# Patient Record
Sex: FEMALE | Race: WHITE | Employment: FULL TIME | URBAN - METROPOLITAN AREA
[De-identification: names, ages, dates, MRNs, and addresses within clinical notes are randomized per-mention and may not be internally consistent; named-entity substitution may affect disease eponyms.]

---

## 2017-04-19 DIAGNOSIS — F51.01 PRIMARY INSOMNIA: ICD-10-CM

## 2017-04-21 ENCOUNTER — TELEPHONE (OUTPATIENT)
Dept: FAMILY MEDICINE CLINIC | Age: 52
End: 2017-04-21

## 2017-04-21 DIAGNOSIS — F51.01 PRIMARY INSOMNIA: ICD-10-CM

## 2017-04-21 RX ORDER — TEMAZEPAM 30 MG/1
CAPSULE ORAL
Qty: 30 CAPSULE | Refills: 5 | OUTPATIENT
Start: 2017-04-21

## 2017-04-21 RX ORDER — TEMAZEPAM 30 MG/1
CAPSULE ORAL
Qty: 5 CAPSULE | Refills: 0 | Status: SHIPPED | OUTPATIENT
Start: 2017-04-21 | End: 2017-04-24 | Stop reason: SDUPTHER

## 2017-04-24 ENCOUNTER — OFFICE VISIT (OUTPATIENT)
Dept: FAMILY MEDICINE CLINIC | Age: 52
End: 2017-04-24

## 2017-04-24 VITALS
WEIGHT: 172 LBS | BODY MASS INDEX: 31.65 KG/M2 | HEIGHT: 62 IN | RESPIRATION RATE: 18 BRPM | HEART RATE: 80 BPM | DIASTOLIC BLOOD PRESSURE: 80 MMHG | TEMPERATURE: 98.1 F | SYSTOLIC BLOOD PRESSURE: 124 MMHG

## 2017-04-24 DIAGNOSIS — F51.01 PRIMARY INSOMNIA: ICD-10-CM

## 2017-04-24 DIAGNOSIS — M15.9 PRIMARY OSTEOARTHRITIS INVOLVING MULTIPLE JOINTS: ICD-10-CM

## 2017-04-24 DIAGNOSIS — J30.89 PERENNIAL ALLERGIC RHINITIS WITH SEASONAL VARIATION: ICD-10-CM

## 2017-04-24 DIAGNOSIS — Z12.11 COLON CANCER SCREENING: ICD-10-CM

## 2017-04-24 DIAGNOSIS — J30.2 PERENNIAL ALLERGIC RHINITIS WITH SEASONAL VARIATION: ICD-10-CM

## 2017-04-24 DIAGNOSIS — M19.90 ARTHRITIS: Primary | ICD-10-CM

## 2017-04-24 DIAGNOSIS — M81.0 OSTEOPOROSIS: ICD-10-CM

## 2017-04-24 PROCEDURE — 99214 OFFICE O/P EST MOD 30 MIN: CPT | Performed by: FAMILY MEDICINE

## 2017-04-24 RX ORDER — TEMAZEPAM 30 MG/1
CAPSULE ORAL
Qty: 5 CAPSULE | Refills: 0 | Status: CANCELLED | OUTPATIENT
Start: 2017-04-24

## 2017-04-24 RX ORDER — TEMAZEPAM 30 MG/1
CAPSULE ORAL
Qty: 30 CAPSULE | Refills: 0 | Status: SHIPPED | OUTPATIENT
Start: 2017-04-24 | End: 2018-01-10 | Stop reason: ALTCHOICE

## 2017-04-24 RX ORDER — AMITRIPTYLINE HYDROCHLORIDE 25 MG/1
25 TABLET, FILM COATED ORAL NIGHTLY
Qty: 30 TABLET | Refills: 3 | Status: SHIPPED | OUTPATIENT
Start: 2017-04-24 | End: 2017-08-25 | Stop reason: SDUPTHER

## 2017-04-24 ASSESSMENT — PATIENT HEALTH QUESTIONNAIRE - PHQ9
SUM OF ALL RESPONSES TO PHQ9 QUESTIONS 1 & 2: 0
1. LITTLE INTEREST OR PLEASURE IN DOING THINGS: 0
SUM OF ALL RESPONSES TO PHQ QUESTIONS 1-9: 0
2. FEELING DOWN, DEPRESSED OR HOPELESS: 0

## 2017-08-25 RX ORDER — AMITRIPTYLINE HYDROCHLORIDE 25 MG/1
TABLET, FILM COATED ORAL
Qty: 30 TABLET | Refills: 3 | Status: SHIPPED | OUTPATIENT
Start: 2017-08-25

## 2018-01-10 ENCOUNTER — OFFICE VISIT (OUTPATIENT)
Dept: FAMILY MEDICINE CLINIC | Age: 53
End: 2018-01-10

## 2018-01-10 VITALS
HEART RATE: 96 BPM | DIASTOLIC BLOOD PRESSURE: 80 MMHG | HEIGHT: 62 IN | WEIGHT: 175 LBS | BODY MASS INDEX: 32.2 KG/M2 | RESPIRATION RATE: 18 BRPM | SYSTOLIC BLOOD PRESSURE: 112 MMHG

## 2018-01-10 DIAGNOSIS — M54.50 ACUTE MIDLINE LOW BACK PAIN WITHOUT SCIATICA: Primary | ICD-10-CM

## 2018-01-10 PROCEDURE — 99213 OFFICE O/P EST LOW 20 MIN: CPT | Performed by: FAMILY MEDICINE

## 2018-01-10 RX ORDER — NAPROXEN 500 MG/1
500 TABLET ORAL 2 TIMES DAILY WITH MEALS
Qty: 60 TABLET | Refills: 3 | Status: SHIPPED | OUTPATIENT
Start: 2018-01-10 | End: 2019-01-10

## 2018-01-10 RX ORDER — ACETAMINOPHEN AND CODEINE PHOSPHATE 300; 30 MG/1; MG/1
1 TABLET ORAL
Qty: 35 TABLET | Refills: 0 | Status: SHIPPED | OUTPATIENT
Start: 2018-01-10 | End: 2018-01-17

## 2018-01-10 NOTE — PROGRESS NOTES
Chief Complaint   Patient presents with    Back Pain     fell down stairs        HPI: Gricelad Menon is a 45 y.o. female presenting for evaluation of back pain. She fell down stairs at home on 1-5-18. She did not seek ER. She has tried Extra Strength Tylenol with no relief. Heating pad and ice have not helped. She thinks her socks slipped, she lost her footing and fell down 3 or 4 steps and wound up twisted at the bottom of the stairs. Controlled Substances Monitoring: Attestation: The Prescription Monitoring Report for this patient was reviewed today. Estefanía Fletcher MD)  Documentation: No signs of potential drug abuse or diversion identified. Estefanía Fletcher MD). Temazepam is the only control substances prescribed for this patient in the last year and that has not been done recently. EXAM:  Constitutional Blood pressure 112/80, pulse 96, resp. rate 18, height 5' 2\" (1.575 m), weight 175 lb (79.4 kg). Physical Exam   Cardiovascular: Normal rate and regular rhythm. Pulmonary/Chest: Effort normal and breath sounds normal. No respiratory distress. She has no wheezes. Abdominal: Soft. She exhibits no distension. This patient is obese. Body mass index is 32.01 kg/m². Musculoskeletal:   The patient has point tenderness in the mid to lower lumbar spine in the midline. No sacroiliac joint tenderness. DIAGNOSIS:   1. Acute midline low back pain without sciatica  XR LUMBAR SPINE (MIN 4 VIEWS)    naproxen (EC-NAPROSYN) 500 MG EC tablet    acetaminophen-codeine (TYLENOL #3) 300-30 MG per tablet    Symptomatic, treat acute pain. due to high cost of x-rays here relative to what she would have to pay at [facility], patient will go to [facility] for her x-rays. In the meantime we will treat this as acute low back pain. Plan for follow up: Follow up in near future for abnormal x-ray results or poor pain response. Other follow up as needed.       Electronically signed by Flavio Abreu, 8:19 PM 1/10/18

## 2018-01-18 ENCOUNTER — TELEPHONE (OUTPATIENT)
Dept: FAMILY MEDICINE CLINIC | Age: 53
End: 2018-01-18

## 2018-01-18 NOTE — TELEPHONE ENCOUNTER
Patient called in because she needs a referral to [facility] in [address] [place] [address], [address] [facility]. Please faxed referral to  office along with x-ray report from 1/10/18 done at [facility]. [facility]#[phone]. Patient has an appointment on Monday (1/22/18). Please advise.

## 2019-06-13 ENCOUNTER — TELEPHONE (OUTPATIENT)
Dept: ADMINISTRATIVE | Age: 54
End: 2019-06-13

## 2023-10-13 ENCOUNTER — OFFICE VISIT (OUTPATIENT)
Dept: PRIMARY CARE | Facility: CLINIC | Age: 58
End: 2023-10-13
Payer: COMMERCIAL

## 2023-10-13 VITALS
BODY MASS INDEX: 35.08 KG/M2 | HEART RATE: 76 BPM | DIASTOLIC BLOOD PRESSURE: 100 MMHG | HEIGHT: 63 IN | SYSTOLIC BLOOD PRESSURE: 160 MMHG | WEIGHT: 198 LBS

## 2023-10-13 DIAGNOSIS — M06.9 RHEUMATOID ARTHRITIS, INVOLVING UNSPECIFIED SITE, UNSPECIFIED WHETHER RHEUMATOID FACTOR PRESENT (MULTI): ICD-10-CM

## 2023-10-13 DIAGNOSIS — Q35.9 CLEFT PALATE (HHS-HCC): ICD-10-CM

## 2023-10-13 DIAGNOSIS — M06.9 RHEUMATOID ARTHRITIS INVOLVING MULTIPLE SITES, UNSPECIFIED WHETHER RHEUMATOID FACTOR PRESENT (MULTI): Primary | ICD-10-CM

## 2023-10-13 DIAGNOSIS — I10 PRIMARY HYPERTENSION: ICD-10-CM

## 2023-10-13 DIAGNOSIS — Z12.9 CANCER SCREENING: ICD-10-CM

## 2023-10-13 DIAGNOSIS — D72.829 LEUKOCYTOSIS, UNSPECIFIED TYPE: ICD-10-CM

## 2023-10-13 DIAGNOSIS — Z13.6 SCREENING, ISCHEMIC HEART DISEASE: ICD-10-CM

## 2023-10-13 PROBLEM — E66.09 CLASS 2 OBESITY DUE TO EXCESS CALORIES WITH BODY MASS INDEX (BMI) OF 35.0 TO 35.9 IN ADULT: Status: ACTIVE | Noted: 2023-10-13

## 2023-10-13 PROBLEM — E66.812 CLASS 2 OBESITY DUE TO EXCESS CALORIES WITH BODY MASS INDEX (BMI) OF 35.0 TO 35.9 IN ADULT: Status: ACTIVE | Noted: 2023-10-13

## 2023-10-13 PROCEDURE — 3077F SYST BP >= 140 MM HG: CPT | Performed by: INTERNAL MEDICINE

## 2023-10-13 PROCEDURE — 3080F DIAST BP >= 90 MM HG: CPT | Performed by: INTERNAL MEDICINE

## 2023-10-13 PROCEDURE — 99214 OFFICE O/P EST MOD 30 MIN: CPT | Performed by: INTERNAL MEDICINE

## 2023-10-13 PROCEDURE — 1036F TOBACCO NON-USER: CPT | Performed by: INTERNAL MEDICINE

## 2023-10-13 RX ORDER — NABUMETONE 500 MG/1
500 TABLET, FILM COATED ORAL 2 TIMES DAILY
COMMUNITY
Start: 2023-09-27

## 2023-10-13 RX ORDER — ERGOCALCIFEROL 1.25 MG/1
50000 CAPSULE ORAL
COMMUNITY
Start: 2021-11-10

## 2023-10-13 RX ORDER — LOSARTAN POTASSIUM AND HYDROCHLOROTHIAZIDE 12.5; 5 MG/1; MG/1
1 TABLET ORAL DAILY
Qty: 30 TABLET | Refills: 11 | Status: SHIPPED | OUTPATIENT
Start: 2023-10-13 | End: 2023-11-06

## 2023-10-13 RX ORDER — FOLIC ACID 1 MG/1
1 TABLET ORAL DAILY
COMMUNITY
Start: 2022-03-06

## 2023-10-13 RX ORDER — MONTELUKAST SODIUM 10 MG/1
10 TABLET ORAL NIGHTLY
COMMUNITY

## 2023-10-13 RX ORDER — METHOTREXATE 2.5 MG/1
8 TABLET ORAL
COMMUNITY
Start: 2022-09-08

## 2023-10-13 RX ORDER — TOFACITINIB 11 MG/1
11 TABLET, FILM COATED, EXTENDED RELEASE ORAL DAILY
COMMUNITY

## 2023-10-13 ASSESSMENT — ENCOUNTER SYMPTOMS
ARTHRALGIAS: 1
GASTROINTESTINAL NEGATIVE: 1
CONSTITUTIONAL NEGATIVE: 1
CARDIOVASCULAR NEGATIVE: 1
HYPERTENSION: 1
PSYCHIATRIC NEGATIVE: 1

## 2023-10-13 NOTE — PROGRESS NOTES
"Subjective   Patient ID: Petra Higuera is a 58 y.o. female who presents for Weight Check (Patient feels like she's gaining weight) and Hypertension (Patient blood pressure is going up).    Patient here for follow-up she is unable to lose weight she tried intermittent fasting her blood pressure is out of control she does have rheumatoid arthritis and sees rheumatology.  She does have arthritic and nerve pain.  She is requesting Adipex but we cannot give it to her at this point due to uncontrolled blood pressure.  She can be a candidate for Wegovy but cost for that is very high for her is about $1000 per month which obviously is not affordable for anyone    Hypertension       Patient blood pressure is not good she is asymptomatic no headaches or dizziness but at a level where her blood pressure is she cannot be given weight lowering medicines like Adipex she needs to follow DASH diet and lifestyle modification  Review of Systems   Constitutional: Negative.    HENT: Negative.     Cardiovascular: Negative.    Gastrointestinal: Negative.    Genitourinary: Negative.    Musculoskeletal:  Positive for arthralgias.        RA, Osteoporosis   Psychiatric/Behavioral: Negative.     All other systems reviewed and are negative.      Objective   Ht 1.6 m (5' 3\")   Wt 89.8 kg (198 lb)   BMI 35.07 kg/m²     Physical Exam  Vitals reviewed.   Constitutional:       Appearance: Normal appearance.   Eyes:      Conjunctiva/sclera: Conjunctivae normal.   Cardiovascular:      Rate and Rhythm: Normal rate and regular rhythm.      Heart sounds: Normal heart sounds.   Pulmonary:      Effort: Pulmonary effort is normal.   Musculoskeletal:      Cervical back: Normal range of motion.      Right lower leg: No edema.      Left lower leg: No edema.   Neurological:      General: No focal deficit present.      Mental Status: She is alert and oriented to person, place, and time.   Psychiatric:         Mood and Affect: Mood normal.         Behavior: " Behavior normal.         Thought Content: Thought content normal.         Assessment/Plan   Problem List Items Addressed This Visit             ICD-10-CM    Cleft palate Q35.9    Rheumatoid arthritis (CMS/HCC) - Primary M06.9    Relevant Orders    Sedimentation Rate     Other Visit Diagnoses         Codes    Screening, ischemic heart disease     Z13.6    Relevant Orders    CT cardiac scoring wo IV contrast (Completed)    Primary hypertension     I10    Relevant Medications    losartan-hydrochlorothiazide (Hyzaar) 50-12.5 mg tablet    Other Relevant Orders    Comprehensive Metabolic Panel    Leukocytosis, unspecified type     D72.829    Relevant Orders    CBC and Auto Differential        Patient was put on losartan hydrochlorothiazide blood work in 2 weeks we will also do CAT scan of the coronaries schedule stratify cardiovascular risk she can be given GLP analogs for weight loss if her insurance approves and if it is affordable at some point Adipex is only short-term and before we give her that she needs to have good blood pressure readings short-term follow-up advised also would recommend ambulatory blood pressure checks on a regular basis

## 2023-10-14 ENCOUNTER — ANCILLARY PROCEDURE (OUTPATIENT)
Dept: RADIOLOGY | Facility: CLINIC | Age: 58
End: 2023-10-14
Payer: COMMERCIAL

## 2023-10-14 DIAGNOSIS — M81.0 AGE-RELATED OSTEOPOROSIS WITHOUT CURRENT PATHOLOGICAL FRACTURE: ICD-10-CM

## 2023-10-14 PROCEDURE — 77080 DXA BONE DENSITY AXIAL: CPT | Performed by: RADIOLOGY

## 2023-10-14 PROCEDURE — 77080 DXA BONE DENSITY AXIAL: CPT

## 2023-10-18 ENCOUNTER — ANCILLARY PROCEDURE (OUTPATIENT)
Dept: RADIOLOGY | Facility: CLINIC | Age: 58
End: 2023-10-18
Payer: COMMERCIAL

## 2023-10-18 DIAGNOSIS — Z12.9 CANCER SCREENING: ICD-10-CM

## 2023-10-18 PROCEDURE — 77067 SCR MAMMO BI INCL CAD: CPT

## 2023-10-18 PROCEDURE — 77067 SCR MAMMO BI INCL CAD: CPT | Performed by: RADIOLOGY

## 2023-10-18 PROCEDURE — 77063 BREAST TOMOSYNTHESIS BI: CPT | Performed by: RADIOLOGY

## 2023-10-19 RX ORDER — GABAPENTIN 300 MG/1
300 CAPSULE ORAL 2 TIMES DAILY
Qty: 180 CAPSULE | Refills: 1 | Status: SHIPPED | OUTPATIENT
Start: 2023-10-19

## 2023-10-21 ENCOUNTER — ANCILLARY PROCEDURE (OUTPATIENT)
Dept: RADIOLOGY | Facility: CLINIC | Age: 58
End: 2023-10-21
Payer: COMMERCIAL

## 2023-10-21 DIAGNOSIS — Z13.6 SCREENING, ISCHEMIC HEART DISEASE: ICD-10-CM

## 2023-10-21 PROCEDURE — 75571 CT HRT W/O DYE W/CA TEST: CPT

## 2023-11-04 DIAGNOSIS — I10 PRIMARY HYPERTENSION: ICD-10-CM

## 2023-11-06 RX ORDER — LOSARTAN POTASSIUM AND HYDROCHLOROTHIAZIDE 12.5; 5 MG/1; MG/1
1 TABLET ORAL DAILY
Qty: 90 TABLET | Refills: 1 | Status: SHIPPED | OUTPATIENT
Start: 2023-11-06

## 2023-11-15 DIAGNOSIS — I10 PRIMARY HYPERTENSION: ICD-10-CM

## 2023-11-15 RX ORDER — LOSARTAN POTASSIUM AND HYDROCHLOROTHIAZIDE 12.5; 1 MG/1; MG/1
1 TABLET ORAL DAILY
Qty: 90 TABLET | Refills: 1 | Status: SHIPPED | OUTPATIENT
Start: 2023-11-15 | End: 2024-11-14

## 2023-11-17 ENCOUNTER — APPOINTMENT (OUTPATIENT)
Dept: PRIMARY CARE | Facility: CLINIC | Age: 58
End: 2023-11-17
Payer: COMMERCIAL

## 2023-12-01 ENCOUNTER — APPOINTMENT (OUTPATIENT)
Dept: PRIMARY CARE | Facility: CLINIC | Age: 58
End: 2023-12-01
Payer: COMMERCIAL

## 2024-06-27 DIAGNOSIS — I10 PRIMARY HYPERTENSION: ICD-10-CM

## 2024-06-27 RX ORDER — LOSARTAN POTASSIUM AND HYDROCHLOROTHIAZIDE 12.5; 1 MG/1; MG/1
1 TABLET ORAL DAILY
Qty: 30 TABLET | Refills: 0 | Status: SHIPPED | OUTPATIENT
Start: 2024-06-27

## 2024-07-02 DIAGNOSIS — M06.9 RHEUMATOID ARTHRITIS, INVOLVING UNSPECIFIED SITE, UNSPECIFIED WHETHER RHEUMATOID FACTOR PRESENT (MULTI): ICD-10-CM

## 2024-07-02 RX ORDER — GABAPENTIN 300 MG/1
300 CAPSULE ORAL 2 TIMES DAILY
Qty: 60 CAPSULE | Refills: 0 | Status: SHIPPED | OUTPATIENT
Start: 2024-07-02

## 2025-03-10 ENCOUNTER — APPOINTMENT (OUTPATIENT)
Dept: OPHTHALMOLOGY | Facility: CLINIC | Age: 60
End: 2025-03-10
Payer: COMMERCIAL

## 2025-03-10 DIAGNOSIS — Z96.1 PSEUDOPHAKIA OF BOTH EYES: ICD-10-CM

## 2025-03-10 DIAGNOSIS — H02.831 DERMATOCHALASIS OF BOTH UPPER EYELIDS: ICD-10-CM

## 2025-03-10 DIAGNOSIS — H18.513 FUCHS' CORNEAL DYSTROPHY OF BOTH EYES: Primary | ICD-10-CM

## 2025-03-10 DIAGNOSIS — M05.79 RHEUMATOID ARTHRITIS INVOLVING MULTIPLE SITES WITH POSITIVE RHEUMATOID FACTOR (MULTI): ICD-10-CM

## 2025-03-10 DIAGNOSIS — H02.834 DERMATOCHALASIS OF BOTH UPPER EYELIDS: ICD-10-CM

## 2025-03-10 DIAGNOSIS — M35.01 SJOGREN'S SYNDROME WITH KERATOCONJUNCTIVITIS SICCA (MULTI): ICD-10-CM

## 2025-03-10 PROCEDURE — 92286 ANT SGM IMG I&R SPECLR MIC: CPT | Performed by: OPHTHALMOLOGY

## 2025-03-10 PROCEDURE — 99204 OFFICE O/P NEW MOD 45 MIN: CPT | Performed by: OPHTHALMOLOGY

## 2025-03-10 RX ORDER — CYCLOSPORINE 0.5 MG/ML
1 EMULSION OPHTHALMIC 2 TIMES DAILY
Qty: 60 EACH | Refills: 3 | Status: SHIPPED | OUTPATIENT
Start: 2025-03-10 | End: 2025-06-08

## 2025-03-10 RX ORDER — NEOMYCIN SULFATE, POLYMYXIN B SULFATE, AND DEXAMETHASONE 3.5; 10000; 1 MG/G; [USP'U]/G; MG/G
OINTMENT OPHTHALMIC
Qty: 3.5 G | Refills: 1 | Status: SHIPPED | OUTPATIENT
Start: 2025-03-10

## 2025-03-10 ASSESSMENT — TONOMETRY
IOP_METHOD: GOLDMANN APPLANATION
OS_IOP_MMHG: 16
OD_IOP_MMHG: 16

## 2025-03-10 ASSESSMENT — VISUAL ACUITY
METHOD: SNELLEN - LINEAR
OS_PH_CC: 20/30
OD_PH_CC: 20/50
OS_CC: 20/40
CORRECTION_TYPE: GLASSES
OD_CC: 20/70

## 2025-03-10 ASSESSMENT — SLIT LAMP EXAM - LIDS
COMMENTS: 2+ DERMATOCHALASIS - UPPER LID
COMMENTS: 2+ DERMATOCHALASIS - UPPER LID

## 2025-03-10 ASSESSMENT — ENCOUNTER SYMPTOMS
GASTROINTESTINAL NEGATIVE: 0
CARDIOVASCULAR NEGATIVE: 0
ALLERGIC/IMMUNOLOGIC NEGATIVE: 0
MUSCULOSKELETAL NEGATIVE: 0
HEMATOLOGIC/LYMPHATIC NEGATIVE: 0
EYES NEGATIVE: 1
RESPIRATORY NEGATIVE: 0
PSYCHIATRIC NEGATIVE: 0
CONSTITUTIONAL NEGATIVE: 0
ENDOCRINE NEGATIVE: 0
NEUROLOGICAL NEGATIVE: 0

## 2025-03-10 ASSESSMENT — EXTERNAL EXAM - LEFT EYE: OS_EXAM: NORMAL

## 2025-03-10 ASSESSMENT — EXTERNAL EXAM - RIGHT EYE: OD_EXAM: NORMAL

## 2025-03-10 NOTE — PROGRESS NOTES
Assessment/Plan   Diagnoses and all orders for this visit:  Fuchs' corneal dystrophy of both eyes  [place] from [place]. Rivera ([facility] [place]  -     Endothelial Photo and Cell Count - OU - Both Eyes  Mild (Grade 3 OU)  Explained to pt that FECD is not responsible for her sxs (she wakes up with foreign body (FB) sensation and blurry vision OU)    Pseudophakia of both eyes  S/p CE OU 11 and 12/2023 (Dr. Melendez)    Rheumatoid arthritis involving multiple sites with positive rheumatoid factor (Multi)  Sjogren's syndrome with keratoconjunctivitis sicca (Multi)  Explained to pt that her dryness could be responsible for her sxs  -     Anti-SSA; Future  -     Anti-SSB; Future  -     cycloSPORINE (Restasis) 0.05 % ophthalmic emulsion; Administer 1 drop into both eyes 2 times a day.  -     neomycin-polymyxin B-dexameth (Polydex) 3.5 mg/g-10,000 unit/g-0.1 % ointment ophthalmic ointment; Apply to both eyes at bedtime    Dermatochalasis of both upper eyelids  There is an element also of floppy eyelids  Recommend wearing sleeping mask    6 weeks

## 2025-03-10 NOTE — LETTER
March 10, 2025    Delroy Rivera, OD  [address]  [facility] [address]     Patient:  Petra Higuera  YOB: 1965  Date of Visit:  3/10/2025      Dear Dr. Delroy Rivera, OD:    Thank you for referring Petra Higuera to me for evaluation. Here is my assessment and plan of care:    Assessment/Plan:  Diagnoses and all orders for this visit:  Fuchs' corneal dystrophy of both eyes  Referred from Dr. Rivera ([facility])  -     Endothelial Photo and Cell Count - OU - Both Eyes  Mild (Grade 3 OU)  Explained to pt that FECD is not responsible for her sxs (she wakes up with foreign body (FB) sensation and blurry vision OU)    Pseudophakia of both eyes  S/p CE OU 11 and 12/2023 (Dr. Melendez)    Rheumatoid arthritis involving multiple sites with positive rheumatoid factor (Multi)  Sjogren's syndrome with keratoconjunctivitis sicca (Multi)  Explained to pt that her dryness could be responsible for her sxs  -     Anti-SSA; Future  -     Anti-SSB; Future  -     cycloSPORINE (Restasis) 0.05 % ophthalmic emulsion; Administer 1 drop into both eyes 2 times a day.  -     neomycin-polymyxin B-dexameth (Polydex) 3.5 mg/g-10,000 unit/g-0.1 % ointment ophthalmic ointment; Apply to both eyes at bedtime    Dermatochalasis of both upper eyelids  There is an element also of floppy eyelids  Recommend wearing sleeping mask    6 weeks      Below you will find my full exam findings. If you have questions, please do not hesitate to call me. I look forward to following Petra along with you.         Sincerely,        Roberto Shetty MD        CC:   No Recipients        Base Eye Exam       Visual Acuity (Snellen - Linear)    RightLeftDist cc20/7020/40  Dist ph cc  20/50  20/30  Correction: Glasses        Tonometry (Goldmann Applanation, 2:11 PM)    [id number]        Pupils    PupilsRightPERRL, No APD  Left  PERRL, No APD        Extraocular Movement    RightLeftFull, OrthoFull, Ortho        Neuro/Psych   Oriented x3: Yes      Slit Lamp and Fundus  Exam       External Exam    RightLeftExternalNormalNormal        Slit Lamp Exam    RightLeftLids/Lashes2+ Dermatochalasis - upper lid2+ Dermatochalasis - upper lid  Conjunctiva/Sclera  White and quiet  White and quiet  Cornea  2+ Guttata, reduced TBUT, 2+ Punctate epithelial erosions  2+ Guttata, reduced TBUT, 2+ Punctate epithelial erosions  Anterior Chamber  Deep and quiet  Deep and quiet  Iris  Round and reactive  Round and reactive  Lens  Posterior chamber intraocular lens  Posterior chamber intraocular lens  Anterior Vitreous  Normal  Normal

## 2025-03-18 LAB
ENA SS-A AB SER IA-ACNC: NORMAL AI
ENA SS-B AB SER IA-ACNC: NORMAL AI

## 2025-04-21 ENCOUNTER — APPOINTMENT (OUTPATIENT)
Dept: OPHTHALMOLOGY | Facility: CLINIC | Age: 60
End: 2025-04-21
Payer: COMMERCIAL